# Patient Record
Sex: FEMALE | Race: ASIAN | NOT HISPANIC OR LATINO | URBAN - METROPOLITAN AREA
[De-identification: names, ages, dates, MRNs, and addresses within clinical notes are randomized per-mention and may not be internally consistent; named-entity substitution may affect disease eponyms.]

---

## 2023-06-20 ENCOUNTER — LAB (OUTPATIENT)
Dept: LAB | Facility: LAB | Age: 28
End: 2023-06-20
Payer: COMMERCIAL

## 2023-06-20 LAB — TOBACCO SCREEN, URINE: NEGATIVE

## 2023-10-14 PROBLEM — N89.8 VAGINAL DISCHARGE: Status: ACTIVE | Noted: 2023-10-14

## 2023-10-14 RX ORDER — TRIAMCINOLONE ACETONIDE 1 MG/G
1 CREAM TOPICAL 2 TIMES DAILY
COMMUNITY
Start: 2021-09-27

## 2023-10-14 RX ORDER — AZELAIC ACID 0.15 G/G
1 GEL TOPICAL DAILY
COMMUNITY
Start: 2021-11-15 | End: 2024-02-10 | Stop reason: ALTCHOICE

## 2023-10-17 ENCOUNTER — OFFICE VISIT (OUTPATIENT)
Dept: OBSTETRICS AND GYNECOLOGY | Facility: CLINIC | Age: 28
End: 2023-10-17
Payer: COMMERCIAL

## 2023-10-17 VITALS
BODY MASS INDEX: 21.44 KG/M2 | WEIGHT: 121 LBS | DIASTOLIC BLOOD PRESSURE: 72 MMHG | HEIGHT: 63 IN | SYSTOLIC BLOOD PRESSURE: 117 MMHG

## 2023-10-17 DIAGNOSIS — Z01.419 WELL WOMAN EXAM WITH ROUTINE GYNECOLOGICAL EXAM: Primary | ICD-10-CM

## 2023-10-17 DIAGNOSIS — L70.8 OTHER ACNE: ICD-10-CM

## 2023-10-17 PROCEDURE — 99395 PREV VISIT EST AGE 18-39: CPT | Performed by: OBSTETRICS & GYNECOLOGY

## 2023-10-17 PROCEDURE — 1036F TOBACCO NON-USER: CPT | Performed by: OBSTETRICS & GYNECOLOGY

## 2023-10-17 PROCEDURE — 88175 CYTOPATH C/V AUTO FLUID REDO: CPT

## 2023-10-17 NOTE — PROGRESS NOTES
"Theresa Kiran is a 28 y.o. female who is here for a routine exam.     Periods are  every other month , lasting 2-10 days.       Sexually active: Condoms    Regular self breast exam: yes    Menstrual History:  OB History          0    Para   0    Term   0       0    AB   0    Living   0         SAB   0    IAB   0    Ectopic   0    Multiple   0    Live Births   0                Patient's last menstrual period was 10/04/2023.         Review of Systems  All Normal Review of Systems  Constitutional: no fever, no chills, no recent weight gain, no recent weight loss and no fatigue.    Gastrointestinal: no abdominal pain, no constipation, no nausea, no diarrhea and no vomiting.    Genitourinary: no dysuria, no urinary incontinence, no vaginal dryness, no vaginal itching, no dyspareunia, no pelvic pain, no dysmenorrhea, no sexual problems, no change in urinary frequency, no vaginal discharge, no unexplained vaginal bleeding and no lesion/sore.    Breasts: No masses.  No nipple discharge.  No redness    Objective   /72   Ht 1.6 m (5' 3\")   Wt 54.9 kg (121 lb)   LMP 10/04/2023   BMI 21.43 kg/m²     OBGyn Exam   Physical Exam  Constitutional: Alert and in no acute distress. Well developed, well nourished.   Head and Face: Head and face: Normal.    Eyes: Normal external exam - nonicteric sclera, extraocular movements intact (EOMI) and no ptosis.   Ears, Nose, Mouth, and Throat: External inspection of ears and nose: Normal.    Neck: No neck asymmetry. Supple. Thyroid not enlarged and there were no palpable thyroid nodules.   Chest: Breasts: Normal appearance, no nipple discharge and no skin changes. Palpation of breasts and axillae: No palpable mass and no axillary lymphadenopathy.   Abdomen: Soft nontender; no abdominal mass palpated. No organomegaly. No hernias.     Genitourinary:   External genitalia: Normal. No inguinal lymphadenopathy. Bartholin's Urethral and Skenes Glands: Normal. Urethra: Normal.  "   Bladder: Normal on palpation.   Vagina: Normal. No discharge  Cervix: Normal.    Uterus: Normal.    Right Adnexa/parametria: Normal.  Left Adnexa/parametria: Normal.    Inspection of Perianal Area: Normal.     Musculoskeletal: No joint swelling seen, normal movements of all extremities.   Skin: Normal skin color and pigmentation, normal skin turgor, and no rash.   Neurologic: Non-focal. Grossly intact.   Psychiatric: Alert and oriented x 3. Affect normal to patient baseline. Mood: Appropriate.      Assessment/Plan   1) Annual exam:  Normal exam  Pap with reflex HPV testing completed  You requested a dermatology referral and I have placed that    Thank you again for your visit to our office today  Please follow-up as needed or in 1 year with your annual exam

## 2023-10-30 LAB
CYTOLOGY CMNT CVX/VAG CYTO-IMP: NORMAL
LAB AP HPV GENOTYPE QUESTION: YES
LAB AP HPV HR: NORMAL
LABORATORY COMMENT REPORT: NORMAL
LMP START DATE: NORMAL
PATH REPORT.TOTAL CANCER: NORMAL

## 2024-02-10 ENCOUNTER — OFFICE VISIT (OUTPATIENT)
Dept: DERMATOLOGY | Facility: CLINIC | Age: 29
End: 2024-02-10
Payer: COMMERCIAL

## 2024-02-10 DIAGNOSIS — D22.9 NEVUS: Primary | ICD-10-CM

## 2024-02-10 DIAGNOSIS — L70.0 ACNE VULGARIS: ICD-10-CM

## 2024-02-10 PROCEDURE — 1036F TOBACCO NON-USER: CPT | Performed by: NURSE PRACTITIONER

## 2024-02-10 PROCEDURE — 99203 OFFICE O/P NEW LOW 30 MIN: CPT | Performed by: NURSE PRACTITIONER

## 2024-02-10 RX ORDER — TRETINOIN 1 MG/G
CREAM TOPICAL
Qty: 135 G | Refills: 3 | Status: SHIPPED | OUTPATIENT
Start: 2024-02-10 | End: 2024-02-12

## 2024-02-10 RX ORDER — AZELAIC ACID 0.15 G/G
GEL TOPICAL
Qty: 100 G | Refills: 3 | Status: SHIPPED | OUTPATIENT
Start: 2024-02-10

## 2024-02-10 ASSESSMENT — DERMATOLOGY QUALITY OF LIFE (QOL) ASSESSMENT
RATE HOW BOTHERED YOU ARE BY SYMPTOMS OF YOUR SKIN PROBLEM (EG, ITCHING, STINGING BURNING, HURTING OR SKIN IRRITATION): 1
RATE HOW EMOTIONALLY BOTHERED YOU ARE BY YOUR SKIN PROBLEM (FOR EXAMPLE, WORRY, EMBARRASSMENT, FRUSTRATION): 1
RATE HOW BOTHERED YOU ARE BY EFFECTS OF YOUR SKIN PROBLEMS ON YOUR ACTIVITIES (EG, GOING OUT, ACCOMPLISHING WHAT YOU WANT, WORK ACTIVITIES OR YOUR RELATIONSHIPS WITH OTHERS): 1
RATE HOW BOTHERED YOU ARE BY SYMPTOMS OF YOUR SKIN PROBLEM (EG, ITCHING, STINGING BURNING, HURTING OR SKIN IRRITATION): 1
RATE HOW BOTHERED YOU ARE BY EFFECTS OF YOUR SKIN PROBLEMS ON YOUR ACTIVITIES (EG, GOING OUT, ACCOMPLISHING WHAT YOU WANT, WORK ACTIVITIES OR YOUR RELATIONSHIPS WITH OTHERS): 1
RATE HOW EMOTIONALLY BOTHERED YOU ARE BY YOUR SKIN PROBLEM (FOR EXAMPLE, WORRY, EMBARRASSMENT, FRUSTRATION): 1

## 2024-02-10 ASSESSMENT — PATIENT GLOBAL ASSESSMENT (PGA): WHAT IS THE PGA: PATIENT GLOBAL ASSESSMENT:  3 - MODERATE

## 2024-02-10 NOTE — PROGRESS NOTES
Subjective     Theresa Kiran is a 28 y.o. female who presents for the following: Suspicious Skin Lesion (Lesion to plantar foot (right). Onset 1 month ago. Denies pain, itching or bleeding. No further complaints.).     Review of Systems:  No other skin or systemic complaints other than what is documented elsewhere in the note.    The following portions of the chart were reviewed this encounter and updated as appropriate:         Skin Cancer History  No skin cancer on file.      Specialty Problems    None       Objective   Well appearing patient in no apparent distress; mood and affect are within normal limits.    A full examination was performed including scalp, head, eyes, ears, nose, lips, neck, chest, axillae, abdomen, back, buttocks, bilateral upper extremities, bilateral lower extremities, hands, feet, fingers, toes, fingernails, and toenails. All findings within normal limits unless otherwise noted below.    Assessment/Plan   1. Nevus  Right Middle Plantar Surface  3mm brown macule with no atypia on dermoscopy          - Reassured, the lesion is benign  - Please call the office if the lesion changes or becomes symptomatic    2. Acne vulgaris  Head - Anterior (Face)  Scattered open comedones    -Discussed the nature of the condition  -Discussed treatment options  -Recommend to begin topical retinoids; if just starting therapy with retinoid, start application of a very thin layer three nights per week as tolerated. If extreme redness or dryness occurs, hold medication until resolved and then restart at a greater interval. May apply moisturizer after application of retinoid. Advance toward nightly use as tolerated  -Recommend: Continue tretinoin 0.1% and azelaic acid, use as directed.

## 2024-02-12 DIAGNOSIS — L70.0 ACNE VULGARIS: Primary | ICD-10-CM

## 2024-02-12 RX ORDER — TRETINOIN 0.1 MG/G
GEL TOPICAL
Qty: 135 G | Refills: 3 | Status: SHIPPED | OUTPATIENT
Start: 2024-02-12

## 2024-02-12 RX ORDER — TRETINOIN 0.1 MG/G
GEL TOPICAL NIGHTLY
Qty: 135 G | Refills: 3 | Status: SHIPPED | OUTPATIENT
Start: 2024-02-12 | End: 2025-02-11

## 2024-02-16 ENCOUNTER — APPOINTMENT (OUTPATIENT)
Dept: DERMATOLOGY | Facility: CLINIC | Age: 29
End: 2024-02-16
Payer: COMMERCIAL

## 2024-05-01 ENCOUNTER — APPOINTMENT (OUTPATIENT)
Dept: DERMATOLOGY | Facility: CLINIC | Age: 29
End: 2024-05-01
Payer: COMMERCIAL

## 2024-07-26 ENCOUNTER — OFFICE VISIT (OUTPATIENT)
Dept: DERMATOLOGY | Facility: CLINIC | Age: 29
End: 2024-07-26
Payer: COMMERCIAL

## 2024-07-26 DIAGNOSIS — D48.5 NEOPLASM OF UNCERTAIN BEHAVIOR OF SKIN: Primary | ICD-10-CM

## 2024-07-26 DIAGNOSIS — D22.9 MELANOCYTIC NEVUS, UNSPECIFIED LOCATION: ICD-10-CM

## 2024-07-26 PROCEDURE — 11104 PUNCH BX SKIN SINGLE LESION: CPT | Performed by: STUDENT IN AN ORGANIZED HEALTH CARE EDUCATION/TRAINING PROGRAM

## 2024-07-26 PROCEDURE — 99213 OFFICE O/P EST LOW 20 MIN: CPT | Performed by: STUDENT IN AN ORGANIZED HEALTH CARE EDUCATION/TRAINING PROGRAM

## 2024-07-26 NOTE — PROGRESS NOTES
Subjective     Theresa Kiran is a 29 y.o. female who presents for the following: Suspicious Skin Lesion (LV 2/10/24 Dr. Mayne. Lesion Right Plantar Foot which has increased in size since last visit. Patient is also concerned with lesion under left ear. No history of skin cancer.).     Review of Systems:  No other skin or systemic complaints other than what is documented elsewhere in the note.    The following portions of the chart were reviewed this encounter and updated as appropriate:          Skin Cancer History  No skin cancer on file.      Specialty Problems    None       Objective   Well appearing patient in no apparent distress; mood and affect are within normal limits.    A focused skin examination was performed. All findings within normal limits unless otherwise noted below.    Assessment/Plan   1. Neoplasm of uncertain behavior of skin  Right Middle Plantar Surface  4 mm brown macule    Lesion biopsy  Type of biopsy: punch    Informed consent: discussed and consent obtained    Timeout: patient name, date of birth, surgical site, and procedure verified    Procedure prep:  Patient was prepped and draped  Anesthesia: the lesion was anesthetized in a standard fashion    Anesthetic:  1% lidocaine w/ epinephrine 1-100,000 local infiltration  Punch size:  4 mm  Hemostasis achieved with: Gelfoam    Outcome: patient tolerated procedure well    Post-procedure details: sterile dressing applied and wound care instructions given    Dressing type: bandage and petrolatum      Staff Communication: Dermatology Local Anesthesia: 1 % Lidocaine / Epinephrine - Amount: 3 ml    Specimen 1 - Dermatopathology- DERM LAB  Differential Diagnosis: growing acral nevus, r/o atypia  Check Margins Yes/No?:    Comments:    Dermpath Lab: Routine Histopathology (formalin-fixed tissue)        2. Melanocytic nevus, unspecified location  Benign mole behind left ear  Counseled on benign nature  Follow up if changing or symptomatic

## 2024-08-12 LAB
LAB AP ASR DISCLAIMER: NORMAL
LABORATORY COMMENT REPORT: NORMAL
PATH REPORT.FINAL DX SPEC: NORMAL
PATH REPORT.GROSS SPEC: NORMAL
PATH REPORT.MICROSCOPIC SPEC OTHER STN: NORMAL
PATH REPORT.RELEVANT HX SPEC: NORMAL
PATH REPORT.TOTAL CANCER: NORMAL

## 2024-08-13 DIAGNOSIS — L81.8 ATYPICAL MELANOCYTIC HYPERPLASIA: Primary | ICD-10-CM

## 2024-08-13 NOTE — RESULT ENCOUNTER NOTE
Dr. Gusman informed patient of Right Middle Plantar Surface Punch Biopsy Results: Atypical Melanocytic Hyperplasia. Excision referral was placed on 8/13/24.

## 2024-08-18 DIAGNOSIS — C43.9 MELANOMA OF SKIN (MULTI): ICD-10-CM

## 2024-08-18 NOTE — TUMOR BOARD NOTE
General Patient Information  Name:  Theresa Kiran  Evaluation #:  1  Conference Date:  8/19/2024  YOB: 1995  MRN:  [id number]  Program Physician(s):  Lanre Granda  Referring Physician(s):  Samir Paris      Summary   Stage:      Assessment:  Atypical melanocytic hyperplasia of the right middle plantar surface with concern for a melanoma in situ.    Recommendation:  WLE with 5 mm margins.    Review Multidisciplinary Cutaneous Oncology Conference recommendation with patient.  Continue routine follow up and total body skin exams with Shala Gusman.    Follow Up:  Samir Paris.      History and Physical Exam  Dermatologic History:   29 y.o. female with a biopsy of the right middle plantar surgace on 7/26/2024 showing an atypical melanocytic hyperplasia with concern for a melanoma in situ.    She is scheduled for a WLE with Dr. Centeno on 8/28/2024.    Past Medical History:    Past Medical History:   Diagnosis Date    Other conditions influencing health status     Nulliparity           Pathology  Dermatopathology- DERM LAB: [id number]  Order: [id number]   Collected 7/26/2024 13:07       Status: Final result       Visible to patient: Yes (seen)       Dx: Neoplasm of uncertain behavior of skin    2 Result Notes      Component    FINAL DIAGNOSIS   SKIN, RIGHT MIDDLE PLANTAR SURFACE, PUNCH BIOPSY:  ATYPICAL MELANOCYTIC HYPERPLASIA, PRESENT ON THE PERIPHERAL MARGIN, SEE NOTE.     Note: Microscopic examination reveals a specimen that extends into the subcutaneous fat. There is a proliferation of nested and single melanocytes along the dermal-epidermal junction with significant pagetoid extension. These melanocytes have mildly enlarged nuclei with mild to moderate cytoplasm. Approximately seventy percent do not stain with antibodies against p16. Approximately thirty percent of the melanocytes stain with antibodies against PRAME. All control slides stain appropriately.     This  specimen has significant pagetoid extension, greater than that typically expected for an acral nevus. With the unusual immunohistochemical staining early melanoma in situ cannot be excluded and a complete excision with 5 mm margins is recommended.     ** Electronically signed out by Nevin Snow MD **

## 2024-08-19 ENCOUNTER — TUMOR BOARD CONFERENCE (OUTPATIENT)
Dept: HEMATOLOGY/ONCOLOGY | Facility: HOSPITAL | Age: 29
End: 2024-08-19
Payer: COMMERCIAL

## 2024-08-28 ENCOUNTER — APPOINTMENT (OUTPATIENT)
Dept: DERMATOLOGY | Facility: CLINIC | Age: 29
End: 2024-08-28
Payer: COMMERCIAL

## 2024-08-28 DIAGNOSIS — D48.5 NEOPLASM OF UNCERTAIN BEHAVIOR OF SKIN: ICD-10-CM

## 2024-08-28 PROCEDURE — 11422 EXC H-F-NK-SP B9+MARG 1.1-2: CPT | Performed by: DERMATOLOGY

## 2024-08-28 RX ORDER — MUPIROCIN 20 MG/G
OINTMENT TOPICAL DAILY
Qty: 22 G | Refills: 0 | Status: SHIPPED | OUTPATIENT
Start: 2024-08-28 | End: 2024-09-11

## 2024-08-28 RX ORDER — CHLORHEXIDINE GLUCONATE 40 MG/ML
SOLUTION TOPICAL DAILY PRN
Qty: 118 ML | Refills: 0 | Status: SHIPPED | OUTPATIENT
Start: 2024-08-28 | End: 2024-09-11

## 2024-08-28 NOTE — PROGRESS NOTES
Excision Operative Note    Date of Surgery:  8/28/2024  Surgeon:  Samir Centeno MD PhD  Office Location: [address] [address]  Dept: [phone]  Dept Fax: [phone]  Referring Provider: Shala Gusman MD  [address] [place] [address]  [address], [address] [place] [address]    Subjective   Theresa Kiran is a 29 y.o. female who presents for the following: Excision (MIS right middle plantar surface) for melanoma.    According to the patient, the lesion has been present for approximately 6 months at the time of diagnosis.  The lesion is growing.  The lesion has not been treated previously.    The patient does not have a pacemaker / defibrillator.  The patient does not have a heart valve / joint replacement.    The patient is not on blood thinners.   The patient does not have a history of hepatitis B or C.  The patient does not have a history of HIV.  The patient does not have a history of immunosuppression (e.g. organ transplantation, malignancy, medications)        The following portions of the chart were reviewed this encounter and updated as appropriate:         Assessment/Plan   Pre-procedure:   Obtained informed consent: written from patient  The surgical site was identified and confirmed with the patient.     Intra-operative:   Audible time out called at : 2:05 PM 08/28/24  by: Pedro Bello MA   Verified patient name, birthdate, site, specimen bottle label & requisition.    The planned procedure(s) was again reviewed with the patient. The risks of bleeding, infection, nerve damage and scarring were reviewed. The patient identity, surgical site, and planned procedure(s) were verified.     Biopsy Accession Number: [id number]  Neoplasm of uncertain behavior of skin  Right Middle Plantar Surface    Skin excision    Lesion length (cm):  0.2  Lesion width (cm):  0.2  Margin per side (cm):  0.5  Total excision diameter (cm):  1.2  Informed consent: discussed and  consent obtained    Timeout: patient name, date of birth, surgical site, and procedure verified    Procedure prep:  Patient prepped in sterile fashion  Anesthesia: the lesion was anesthetized in a standard fashion    Anesthetic:  1% lidocaine w/ epinephrine 1-100,000 local infiltration  Instrument used: #15 blade    Hemostasis achieved with: electrodesiccation    Outcome: patient tolerated procedure well with no complications    Post-procedure details: sterile dressing applied and wound care instructions given    Dressing type: pressure dressing, Gelfoam, petrolatum, Telfa pad and Hypafix    Additional details:  The possible diagnoses were explained. Although the lesion is likely benign, the patient requests removal of the lesion because of the symptoms it is causing. Excision was discussed with the patient. The risks, benefits and potential adverse effects were reviewed. Discussion included but was not limited to the cure rate, relative cost, wound care requirements, activity restrictions, likely scar outcome and time to heal were reviewed. Alternative options including monitoring the lesion were discussed. The patient elected to proceed with excision.     Staff Communication: Dermatology Local Anesthesia: 1 % Lidocaine / Epinephrine - Amount: 6cc    Specimen 1 - Dermatopathology- DERM LAB  Differential Diagnosis: Atypical melanocytic hyperslasia  Check Margins Yes  Comments:  Ref to [id number]  Dermpath Lab: Routine Histopathology (formalin-fixed tissue)    Related Medications  mupirocin (Bactroban) 2 % ointment  Apply topically once daily for 14 days.    chlorhexidine (Hibiclens) 4 % external liquid  Apply topically once daily as needed for wound care for up to 14 days.    Various closure modalities were discussed, and it was decided with the patient that the wound would be allowed to heal by granulation. Additional risks of white scarring and slow healing time explained.    Wound care was discussed, and the  patient was given written post-operative wound care instructions.      The patient will follow up with Samir Centeno MD PhD as needed for any post operative problems or concerns, and will follow up with their primary dermatologist as scheduled.

## 2024-08-30 LAB
LABORATORY COMMENT REPORT: NORMAL
PATH REPORT.FINAL DX SPEC: NORMAL
PATH REPORT.GROSS SPEC: NORMAL
PATH REPORT.RELEVANT HX SPEC: NORMAL
PATH REPORT.TOTAL CANCER: NORMAL

## 2024-09-03 NOTE — RESULT ENCOUNTER NOTE
Patient called. No answer. Voicemail left with call back number and the following results.     Benign. The deep margin is read as involving changes from previous procedure, but the collected specimen was elevated in the fat - well below the depth of the previous shave sampling. There is no need for further sampling of this lesion.

## 2024-10-22 ENCOUNTER — APPOINTMENT (OUTPATIENT)
Dept: OBSTETRICS AND GYNECOLOGY | Facility: CLINIC | Age: 29
End: 2024-10-22
Payer: COMMERCIAL

## 2024-10-22 VITALS
BODY MASS INDEX: 21.79 KG/M2 | WEIGHT: 123 LBS | SYSTOLIC BLOOD PRESSURE: 104 MMHG | DIASTOLIC BLOOD PRESSURE: 60 MMHG | HEIGHT: 63 IN

## 2024-10-22 DIAGNOSIS — Z01.419 WELL WOMAN EXAM WITH ROUTINE GYNECOLOGICAL EXAM: Primary | ICD-10-CM

## 2024-10-22 DIAGNOSIS — N92.6 IRREGULAR MENSTRUAL BLEEDING: ICD-10-CM

## 2024-10-22 PROCEDURE — 1036F TOBACCO NON-USER: CPT | Performed by: OBSTETRICS & GYNECOLOGY

## 2024-10-22 PROCEDURE — 99395 PREV VISIT EST AGE 18-39: CPT | Performed by: OBSTETRICS & GYNECOLOGY

## 2024-10-22 PROCEDURE — 3008F BODY MASS INDEX DOCD: CPT | Performed by: OBSTETRICS & GYNECOLOGY

## 2024-10-22 RX ORDER — MEDROXYPROGESTERONE ACETATE 10 MG/1
TABLET ORAL
Qty: 10 TABLET | Refills: 0 | Status: SHIPPED | OUTPATIENT
Start: 2024-10-22

## 2024-10-22 NOTE — PROGRESS NOTES
"Theresa Kiran is a 29 y.o. female who is here for a annual exam.     Periods are irregular, has not had a cycle in 3 months.       Sexually active: Condoms  Active no concerns    Regular self breast exam: yes  no concerns on her exams    Menstrual History:  OB History          0    Para   0    Term   0       0    AB   0    Living   0         SAB   0    IAB   0    Ectopic   0    Multiple   0    Live Births   0                Patient's last menstrual period was 2024.         Review of Systems  All Normal Review of Systems  Constitutional: no fever, no chills, no recent weight gain, no recent weight loss and no fatigue.    Gastrointestinal: no abdominal pain, no constipation, no nausea, no diarrhea and no vomiting.    Genitourinary: no dysuria, no urinary incontinence, no vaginal dryness, no vaginal itching, no dyspareunia, no pelvic pain, no dysmenorrhea, no sexual problems, no change in urinary frequency, no vaginal discharge, no unexplained vaginal bleeding and no lesion/sore.    Breasts: No masses.  No nipple discharge.  No redness    Objective   /60   Ht 1.6 m (5' 3\")   Wt 55.8 kg (123 lb)   LMP 2024   BMI 21.79 kg/m²     OBGyn Exam   Physical Exam  Constitutional: Alert and in no acute distress. Well developed, well nourished.   Head and Face: Head and face: Normal.    Eyes: Normal external exam - nonicteric sclera, extraocular movements intact (EOMI) and no ptosis.   Ears, Nose, Mouth, and Throat: External inspection of ears and nose: Normal.    Neck: No neck asymmetry. Supple. Thyroid not enlarged and there were no palpable thyroid nodules.   Chest: Breasts: Normal appearance, no nipple discharge and no skin changes. Palpation of breasts and axillae: No palpable mass and no axillary lymphadenopathy.   Abdomen: Soft nontender; no abdominal mass palpated. No organomegaly. No hernias.     Genitourinary:   External genitalia: Normal. No inguinal lymphadenopathy. Bartholin's Urethral " and Skenes Glands: Normal. Urethra: Normal.    Bladder: Normal on palpation.   Vagina: Normal. No discharge  Cervix: Normal.    Uterus: Normal.    Right Adnexa/parametria: Normal.  Left Adnexa/parametria: Normal.    Inspection of Perianal Area: Normal.     Musculoskeletal: No joint swelling seen, normal movements of all extremities.   Skin: Normal skin color and pigmentation, normal skin turgor, and no rash.   Neurologic: Non-focal. Grossly intact.   Psychiatric: Alert and oriented x 3. Affect normal to patient baseline. Mood: Appropriate.      Assessment/Plan   1) Annual exam:  Pap with reflex HPV testing completed    2) Irregular menstruation  Has checked pregnancy test that are negative  Has been under a lot of stress with her work.  History of skipping cycles in the past.  Will give Provera to patient.  Will check a pregnancy test and if negative will take for 10 days.  After 10 nights she should have a cycle.  Will keep me posted on [employer]  Thank you again for your visit to our office today  Please follow-up as needed or in 1 year with your annual exam

## 2024-10-29 ENCOUNTER — APPOINTMENT (OUTPATIENT)
Dept: OTOLARYNGOLOGY | Facility: HOSPITAL | Age: 29
End: 2024-10-29
Payer: COMMERCIAL

## 2024-10-31 LAB
CYTOLOGY CMNT CVX/VAG CYTO-IMP: NORMAL
LAB AP HPV GENOTYPE QUESTION: YES
LAB AP HPV HR: NORMAL
LABORATORY COMMENT REPORT: NORMAL
PATH REPORT.TOTAL CANCER: NORMAL

## 2024-12-03 ENCOUNTER — APPOINTMENT (OUTPATIENT)
Dept: OTOLARYNGOLOGY | Facility: HOSPITAL | Age: 29
End: 2024-12-03
Payer: COMMERCIAL

## 2024-12-30 ENCOUNTER — OFFICE VISIT (OUTPATIENT)
Dept: URGENT CARE | Age: 29
End: 2024-12-30
Payer: COMMERCIAL

## 2024-12-30 VITALS
SYSTOLIC BLOOD PRESSURE: 121 MMHG | HEIGHT: 63 IN | DIASTOLIC BLOOD PRESSURE: 83 MMHG | WEIGHT: 120 LBS | HEART RATE: 108 BPM | BODY MASS INDEX: 21.26 KG/M2 | TEMPERATURE: 101.1 F | OXYGEN SATURATION: 96 %

## 2024-12-30 DIAGNOSIS — J01.10 ACUTE FRONTAL SINUSITIS, RECURRENCE NOT SPECIFIED: ICD-10-CM

## 2024-12-30 DIAGNOSIS — R50.9 FEVER, UNSPECIFIED FEVER CAUSE: ICD-10-CM

## 2024-12-30 DIAGNOSIS — H66.002 ACUTE SUPPURATIVE OTITIS MEDIA OF LEFT EAR WITHOUT SPONTANEOUS RUPTURE OF TYMPANIC MEMBRANE, RECURRENCE NOT SPECIFIED: Primary | ICD-10-CM

## 2024-12-30 DIAGNOSIS — R06.09 DYSPNEA ON EXERTION: ICD-10-CM

## 2024-12-30 LAB
POC RAPID INFLUENZA A: NEGATIVE
POC RAPID INFLUENZA B: NEGATIVE

## 2024-12-30 PROCEDURE — 99070 SPECIAL SUPPLIES PHYS/QHP: CPT | Performed by: NURSE PRACTITIONER

## 2024-12-30 PROCEDURE — 3008F BODY MASS INDEX DOCD: CPT | Performed by: NURSE PRACTITIONER

## 2024-12-30 PROCEDURE — 87804 INFLUENZA ASSAY W/OPTIC: CPT | Performed by: NURSE PRACTITIONER

## 2024-12-30 PROCEDURE — 99203 OFFICE O/P NEW LOW 30 MIN: CPT | Performed by: NURSE PRACTITIONER

## 2024-12-30 PROCEDURE — 1036F TOBACCO NON-USER: CPT | Performed by: NURSE PRACTITIONER

## 2024-12-30 RX ORDER — ALBUTEROL SULFATE 90 UG/1
2 INHALANT RESPIRATORY (INHALATION) EVERY 6 HOURS PRN
Qty: 18 G | Refills: 0 | Status: SHIPPED | OUTPATIENT
Start: 2024-12-30 | End: 2025-12-30

## 2024-12-30 RX ORDER — BENZONATATE 200 MG/1
200 CAPSULE ORAL 3 TIMES DAILY PRN
Qty: 30 CAPSULE | Refills: 0 | Status: SHIPPED | OUTPATIENT
Start: 2024-12-30 | End: 2025-01-06

## 2024-12-30 RX ORDER — AMOXICILLIN AND CLAVULANATE POTASSIUM 875; 125 MG/1; MG/1
875 TABLET, FILM COATED ORAL 2 TIMES DAILY
Qty: 20 TABLET | Refills: 0 | Status: SHIPPED | OUTPATIENT
Start: 2024-12-30

## 2024-12-30 ASSESSMENT — ENCOUNTER SYMPTOMS
FATIGUE: 1
RHINORRHEA: 1
SINUS PAIN: 1
FEVER: 1
COUGH: 1

## 2024-12-30 NOTE — PROGRESS NOTES
Subjective   Patient ID: Theresa Kiran is a 29 y.o. female. They present today with a chief complaint of No chief complaint on file..    History of Present Illness  HPI    Past Medical History  Allergies as of 12/30/2024    (No Known Allergies)       (Not in a hospital admission)       Past Medical History:   Diagnosis Date    Other conditions influencing health status     Nulliparity       Past Surgical History:   Procedure Laterality Date    OTHER SURGICAL HISTORY  11/10/2022    Arm surgery        reports that she has never smoked. She has never used smokeless tobacco. She reports current alcohol use of about 1.0 standard drink of alcohol per week. She reports that she does not use drugs.    Review of Systems  Review of Systems   Constitutional:  Positive for fatigue and fever.   HENT:  Positive for congestion, postnasal drip, rhinorrhea and sinus pain.    Respiratory:  Positive for cough.    All other systems reviewed and are negative.                                 Objective    There were no vitals filed for this visit.  No LMP recorded.    Physical Exam  Vitals and nursing note reviewed.   Constitutional:       Appearance: Normal appearance.   HENT:      Head: Normocephalic.      Right Ear: Tympanic membrane normal.      Left Ear: Tympanic membrane is erythematous and bulging.      Ears:      Comments: Right TM cloudy and left TM erythematous/bulging/opacification     Nose: Congestion and rhinorrhea present. Rhinorrhea is purulent.      Right Turbinates: Enlarged.      Left Turbinates: Enlarged.      Right Sinus: Frontal sinus tenderness present.      Left Sinus: Frontal sinus tenderness present.      Mouth/Throat:      Mouth: Mucous membranes are dry.      Pharynx: Oropharynx is clear. Postnasal drip present.   Eyes:      Extraocular Movements: Extraocular movements intact.      Pupils: Pupils are equal, round, and reactive to light.   Cardiovascular:      Rate and Rhythm: Normal rate and regular rhythm.       Pulses: Normal pulses.      Heart sounds: Normal heart sounds.   Pulmonary:      Effort: Pulmonary effort is normal.      Breath sounds: Normal breath sounds.   Musculoskeletal:         General: Normal range of motion.      Cervical back: Normal range of motion and neck supple.   Lymphadenopathy:      Cervical: Cervical adenopathy present.   Skin:     General: Skin is warm and dry.   Neurological:      General: No focal deficit present.      Mental Status: She is alert and oriented to person, place, and time.   Psychiatric:         Mood and Affect: Mood normal.         Behavior: Behavior normal.         Procedures    Point of Care Test & Imaging Results from this visit  No results found for this visit on 12/30/24.   No results found.    Diagnostic study results (if any) were reviewed by [facility].    Assessment/Plan   Allergies, medications, history, and pertinent labs/EKGs/Imaging reviewed by IDA Silverman.     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 Cough is throughout the day and severity of day or night does not matter.  Pt took x 2 covid tests, neg  Rapid flu- NEG    Pt managing secretions, airway intact. There or no signs of PTA/TA, trismus, retropharyngeal abscess or epiglotitis. No signs of osteomyelitis, orbital cellulitis or other complications of sinusitis at this point in time. CXR deferred at this time there is no signs of respiratory distress,  pt rather be treated first for left AOM with augmentin and agreed to chest xray if s/s persist after 48 hours with treatment. encouraged probiotics for gut health. SpO2 >94% on RA. There is no reported CP, BARRIOS, pleuritic pain, fever. Clinical suspicions of pneumonia at this time are low, high probability of acute frontal sinusitis with left AOM. Pt is ambulating without difficulty showing no signs of hypoxia. Given the pt underlying  risk factors, and exam will err on the side of caution and cover for underlying ARBS/CAP organisms . Pt well-hydrated, nontoxic and there no signs of clinical deterioration. Patient well hydrated, neurovascularly intact.     Follow up Care: Pt instructed to follow-up with PCP or other appropriate clinician within 24 to 48 hours. Report to ED if there is any development in worsening pain, difficulty swallowing, change in phonation, fever, chills, neck pain, photophobia, headache, neck stiffness, chest pain, abdominal pain, vomiting, syncope, hemoptysis, leg swelling SOB, fever, facial swelling, eye pain, periorbital swelling/erythema, or any new signs or sx.     The patient and/or family was educated regarding diagnosis, supportive care, OTC and Rx medications. The patient and/or family was given the opportunity to ask questions prior to discharge. They verbalized understanding of my discussion of the plans for treatment, expected course, indications to return to UC or seek further evaluation in ED, and the need for timely follow up as directed.         Orders and Diagnoses  Diagnoses and all orders for this visit:  Acute suppurative otitis media of left ear without spontaneous rupture of tympanic membrane, recurrence not specified  -     amoxicillin-pot clavulanate (Augmentin) 875-125 mg tablet; Take 1 tablet (875 mg) by mouth 2 times a day.  -     benzonatate (Tessalon) 200 mg capsule; Take 1 capsule (200 mg) by mouth 3 times a day as needed for cough for up to 7 days. Do not crush or chew.  Fever, unspecified fever cause  -     POCT Influenza A/B manually resulted      Medical Admin Record      Patient disposition: Home    Electronically signed by [facility]ETAN[facility]  6:15 PM

## 2024-12-30 NOTE — LETTER
December 30, 2024     Patient: Theresa Kiran   YOB: 1995   Date of Visit: 12/30/2024       To Whom It May Concern:    Theresa Kiran was seen in my clinic on 12/30/2024 at 5:30 pm. Please excuse Theresa for her absence from work on this day until 1/2/2025, thank you!    If you have any questions or concerns, please don't hesitate to call.         Sincerely,         [facility]        CC: No Recipients

## 2025-01-13 ENCOUNTER — TUMOR BOARD CONFERENCE (OUTPATIENT)
Dept: HEMATOLOGY/ONCOLOGY | Facility: HOSPITAL | Age: 30
End: 2025-01-13
Payer: COMMERCIAL

## 2025-01-13 DIAGNOSIS — C43.9 MELANOMA OF SKIN (MULTI): ICD-10-CM

## 2025-01-13 NOTE — TUMOR BOARD NOTE
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 S/p WLE with 5 mm margins. Adequately surgically treated.     Recommendation:  Annual H&P.    Review Multidisciplinary Cutaneous Oncology Conference recommendation with patient.  Continue routine follow up and total body skin exams with Shala Gusman.    Follow Up:  Shala Gusman      History and Physical Exam  Dermatologic History:   29 y.o. female with a biopsy of the right middle plantar surgace on 7/26/2024 showing an atypical melanocytic hyperplasia with concern for a melanoma in situ. She underwent a WLE with 5 mm margins on 8/28/2024 which showed melanocyte hyperplasia, inked margins free in the planes of sections examined and changes consistent with previous procedure present on the deep margin.        Past Medical History:    Past Medical History:   Diagnosis Date    Other conditions influencing health status     Nulliparity           Pathology  Dermatopathology- DERM LAB: [id number]  Order: [id number]   Collected 8/28/2024 14:04       Status: Final result       Visible to patient: Yes (seen)       Dx: Neoplasm of uncertain behavior of skin    2 Result Notes       1 Patient Communication      Component    FINAL DIAGNOSIS   SKIN, RIGHT MIDDLE PLANTAR SURFACE, EXCISION:  MELANOCYTE HYPERPLASIA, INKED MARGINS FREE IN THE PLANES OF SECTIONS EXAMINED AND CHANGES CONSISTENT WITH PREVIOUS PROCEDURE, PRESENT ON THE DEEP MARGIN, SEE NOTE     Note: Microscopic examination reveals a specimen that extends into the subcutaneous fat. An area with horizontally oriented collagen and vertically oriented vessels is present. There is a very mild  increase in single melanocytes with minimally enlarged nuclei along the dermal-epidermal junction.      The melanocyte hyperplasia favors melanocyte hyperplasia from the trauma of a previous procedure rather than residual atypical melanocytic hyperplasia.      ** Electronically signed out by Nevin Snow MD **             Dermatopathology- DERM LAB: [id number]  Order: [id number]   Collected 7/26/2024 13:07       Status: Final result       Visible to patient: Yes (seen)       Dx: Neoplasm of uncertain behavior of skin    2 Result Notes      Component    FINAL DIAGNOSIS   SKIN, RIGHT MIDDLE PLANTAR SURFACE, PUNCH BIOPSY:  ATYPICAL MELANOCYTIC HYPERPLASIA, PRESENT ON THE PERIPHERAL MARGIN, SEE NOTE.     Note: Microscopic examination reveals a specimen that extends into the subcutaneous fat. There is a proliferation of nested and single melanocytes along the dermal-epidermal junction with significant pagetoid extension. These melanocytes have mildly enlarged nuclei with mild to moderate cytoplasm. Approximately seventy percent do not stain with antibodies against p16. Approximately thirty percent of the melanocytes stain with antibodies against PRAME. All control slides stain appropriately.     This specimen has significant pagetoid extension, greater than that typically expected for an acral nevus. With the unusual immunohistochemical staining early melanoma in situ cannot be excluded and a complete excision with 5 mm margins is recommended.     ** Electronically signed out by Nevin Snow MD **

## 2025-02-04 ENCOUNTER — OFFICE VISIT (OUTPATIENT)
Dept: OTOLARYNGOLOGY | Facility: HOSPITAL | Age: 30
End: 2025-02-04
Payer: COMMERCIAL

## 2025-02-04 DIAGNOSIS — K13.79 MUCOCELE OF MOUTH: Primary | ICD-10-CM

## 2025-02-04 PROCEDURE — 1036F TOBACCO NON-USER: CPT | Performed by: STUDENT IN AN ORGANIZED HEALTH CARE EDUCATION/TRAINING PROGRAM

## 2025-02-04 PROCEDURE — 99212 OFFICE O/P EST SF 10 MIN: CPT | Performed by: STUDENT IN AN ORGANIZED HEALTH CARE EDUCATION/TRAINING PROGRAM

## 2025-02-04 PROCEDURE — 99202 OFFICE O/P NEW SF 15 MIN: CPT | Performed by: STUDENT IN AN ORGANIZED HEALTH CARE EDUCATION/TRAINING PROGRAM

## 2025-02-04 ASSESSMENT — PATIENT HEALTH QUESTIONNAIRE - PHQ9
1. LITTLE INTEREST OR PLEASURE IN DOING THINGS: NOT AT ALL
SUM OF ALL RESPONSES TO PHQ9 QUESTIONS 1 & 2: 0
2. FEELING DOWN, DEPRESSED OR HOPELESS: NOT AT ALL

## 2025-02-04 NOTE — PROGRESS NOTES
ENT Outpatient Consultation    Chief Complaint: mucocele    History Of Present Illness  Theresa Kiran is a 29 y.o. female presents for recurring bilateral mucoceles.   Reports them on the retromolar trigone area. Has been going on for years.   Sometimes they spontaneously rupture, other times have been compressed to relieve pain/swelling   Currently does not have any symptoms. Most recent was a few weeks ago.     No other painful lesions in the mouth.   ROS otherwise negative  Currently a Rad Onc resident at .          Past Medical History  Past Medical History:   Diagnosis Date    Other conditions influencing health status     Nulliparity         Surgical History  Past Surgical History:   Procedure Laterality Date    OTHER SURGICAL HISTORY  11/10/2022    Arm surgery        Social History  She reports that she has never smoked. She has never been exposed to tobacco smoke. She has never used smokeless tobacco. She reports that she does not currently use alcohol after a past usage of about 1.0 standard drink of alcohol per week. She reports that she does not use drugs.    Family History  Family History   Problem Relation Name Age of Onset    Lung cancer Mother Lung cancer     Cancer Mother Lung cancer         Allergies  Patient has no known allergies.     Physical Exam:  CONSTITUTIONAL:  No acute distress  VOICE:  No hoarseness or other abnormality  RESPIRATION:  Breathing comfortably, no stridor  CV:  No clubbing/cyanosis/edema in hands  EYES:  EOM intact, sclera normal  NEURO:  Alert and oriented times 3, Cranial nerves II-XII grossly intact and symmetric bilaterally  HEAD AND FACE:  Symmetric facial features, no masses or lesion  SALIVARY GLANDS:  Parotid and submandibular glands normal bilaterally  EARS:  Normal external ears, external auditory canals, and TMs to otoscopy, normal hearing to conversational voice.  NOSE:  External nose midline, anterior rhinoscopy is normal with limited visualization to the anterior  aspect of the interior turbinates, no bleeding or drainage, no lesions  ORAL CAVITY/OROPHARYNX/LIPS:  Normal mucous membranes, normal floor of mouth/tongue/OP, no masses or lesions. RMT with pinpoint areas of thickening consisent with prior cyst/mucocele but no current lesion  PHARYNGEAL WALLS:  No masses or lesions  NECK/LYMPH:  No LAD, no thyroid masses, trachea midline  SKIN:  Neck skin is without scar or injury  PSYCH:  Alert and oriented with appropriate mood and affect     Assessment and Plan  29 y.o. female with recurrent oral cavity mucoceles. Currently no lesions. We discussed that if painful could perform I&D the next time that one occurs. I do not have a mouthrinse or medication that would prevent them. We discussed but I do not recommend excision of that area as there are no current lesions and would not ensure that the lesions dont return    Ana eHrnández MD

## 2025-02-19 ENCOUNTER — OFFICE VISIT (OUTPATIENT)
Dept: PRIMARY CARE | Facility: HOSPITAL | Age: 30
End: 2025-02-19
Payer: COMMERCIAL

## 2025-02-19 VITALS
HEIGHT: 62 IN | OXYGEN SATURATION: 98 % | TEMPERATURE: 96.4 F | BODY MASS INDEX: 21.71 KG/M2 | WEIGHT: 118 LBS | HEART RATE: 80 BPM | DIASTOLIC BLOOD PRESSURE: 76 MMHG | SYSTOLIC BLOOD PRESSURE: 111 MMHG

## 2025-02-19 DIAGNOSIS — H60.93 OTITIS EXTERNA OF BOTH EARS, UNSPECIFIED CHRONICITY, UNSPECIFIED TYPE: Primary | ICD-10-CM

## 2025-02-19 PROCEDURE — 3008F BODY MASS INDEX DOCD: CPT

## 2025-02-19 PROCEDURE — 99213 OFFICE O/P EST LOW 20 MIN: CPT | Mod: GC

## 2025-02-19 PROCEDURE — 99203 OFFICE O/P NEW LOW 30 MIN: CPT

## 2025-02-19 PROCEDURE — 1036F TOBACCO NON-USER: CPT

## 2025-02-19 RX ORDER — ACETIC ACID 20.65 MG/ML
5 SOLUTION AURICULAR (OTIC) 2 TIMES DAILY
Qty: 15 ML | Refills: 0 | Status: SHIPPED | OUTPATIENT
Start: 2025-02-19 | End: 2025-02-26

## 2025-02-19 ASSESSMENT — PAIN SCALES - GENERAL: PAINLEVEL_OUTOF10: 0-NO PAIN

## 2025-02-19 ASSESSMENT — ENCOUNTER SYMPTOMS
OCCASIONAL FEELINGS OF UNSTEADINESS: 0
DEPRESSION: 0
LOSS OF SENSATION IN FEET: 0

## 2025-02-19 ASSESSMENT — PATIENT HEALTH QUESTIONNAIRE - PHQ9
SUM OF ALL RESPONSES TO PHQ9 QUESTIONS 1 AND 2: 0
1. LITTLE INTEREST OR PLEASURE IN DOING THINGS: NOT AT ALL
2. FEELING DOWN, DEPRESSED OR HOPELESS: NOT AT ALL

## 2025-02-19 NOTE — PROGRESS NOTES
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    H/o suspicious skin lesion. ? melanoma, removed from bottom of foot 2024  Recent pneumonia  Irregular menses  Family history of lung cancer    She requests Giardisil vaccine series today. She is also markedly better from her pneumonia after taking augmentin and azithromycin. She has no residual SOB but a lingering cough. She also reports bilateral ear pain/itchiness.    Denies tobacco, occasional ETOH, no drug use    Exam:  Thin woman, NAD  /76   Pulse 80   Temp 35.8 °C (96.4 °F) (Temporal)   Ht 1.575 m (5' 2\")   Wt 53.5 kg (118 lb)   SpO2 98%   BMI 21.58 kg/m²   TM with clear bright light reflex bilaterally, canal without redness or scale  Lungs clear BS  CV RR  Abd soft NT    A/P  Agree with topical treatment to otic canals for symptomatic relief. Encouraged to get giardisil at local pharmacy.   RTC in 1 year or sooner if needed.    I saw and evaluated the patient. I personally obtained the key and critical portions of the history and physical exam or was physically present for key and critical portions performed by the resident/fellow. I reviewed the resident/fellow's documentation and discussed the patient with the resident/fellow. I agree with the resident/fellow's medical decision making as documented in the note.    Carlee Martínez MD      "

## 2025-02-19 NOTE — PROGRESS NOTES
Chief complaint: New Patient Visit    HPI:  Theresa Kiran is a 29 y.o. female with pmh of atypical melanocytic hyperplasia of right foot s/p excision presenting to establish care with [facility].    Her main complaint today is bilateral ear itchiness. She recently had pneumonia in December that was treated with Augmentin and Azithromycin. Since then, she has been doing well, just has an occasional cough. Denies dyspnea, chest pain, fever/chills. At the time of the pneumonia, she also had b/l ear pain/itchiness, no drainage. Urgent care told her had an ear infection but she was not prescribed ear drops. Since then she has had ear itchiness. Denies any pain, drainage, or hearing changes.    She was also interested in the Gardasil vaccine and STI testing today. In our clinic, her vaccine would have been very expensive due to her insurance coverage so she will plan to go to a pharmacy for the vaccine. As for the STI testing, she was interested depending on cost and since we were not able to confirm that the testing would be covered by insurance due to the [facility] transition, she decided to forgo testing today. She has an upcoming appt with OB/Gyn and at that point they may be able to better assist her.    No other complaints today.      PMH: atypical melanocytic hyperplasia    PSH: right elbow surgery in childhood    Allergies: NKDA    Fam Hx: mother with NSCLC lung cancer    Soc Hx: no tobacco use, occ alc use, no drug use; rad onc resident        Medications:  Current Outpatient Medications   Medication Instructions    acetic acid (Vosol) 2 % otic solution 5 drops, Each Ear, 2 times daily    albuterol 90 mcg/actuation inhaler 2 puffs, inhalation, Every 6 hours PRN    amoxicillin-pot clavulanate (Augmentin) 875-125 mg tablet 875 mg, oral, 2 times daily    azelaic acid (Finacea) 15 % gel Thin coat to entire face twice daily. Decrease use if too drying.    medroxyPROGESTERone (Provera) 10 mg tablet Take 1 tablet by mouth daily for 10  days after you have checked for negative pregnancy test    tretinoin (Retin-A) 0.01 % gel Apply Pea-size amount to entire face every day at bedtime, decrease frequency if too drying       Allergies:  No Known Allergies    Past medical history:  Past Medical History:   Diagnosis Date    Other conditions influencing health status     Nulliparity       Surgical history:  Past Surgical History:   Procedure Laterality Date    OTHER SURGICAL HISTORY  11/10/2022    Arm surgery       Family history:  Family History   Problem Relation Name Age of Onset    Lung cancer Mother Lung cancer     Cancer Mother Lung cancer        Social history:   reports that she has never smoked. She has never been exposed to tobacco smoke. She has never used smokeless tobacco. She reports that she does not currently use alcohol after a past usage of about 1.0 standard drink of alcohol per week. She reports that she does not use drugs.    Health maintenance:  Health Maintenance   Topic Date Due    Lipid Panel  Never done    HIV Screening  Never done    Derm Melanoma Skin Check  Never done    Hepatitis C Screening  Never done    Influenza Vaccine (1) 09/01/2024    COVID-19 Vaccine (4 - 2024-25 season) 09/01/2024    Yearly Adult Physical  10/23/2025    DTaP/Tdap/Td Vaccines (7 - Td or Tdap) 09/09/2026    Cervical Cancer Screening  10/22/2027    Zoster Vaccines (1 of 2) 06/29/2046    HIB Vaccines  Completed    Hepatitis B Vaccines  Completed    IPV Vaccines  Completed    Hepatitis A Vaccines  Completed    MMR Vaccines  Completed    Varicella Vaccines  Completed    Meningococcal Vaccine  Completed    HPV Vaccines  Completed    Rotavirus Vaccines  Aged Out    Pneumococcal Vaccine: Pediatrics and At-Risk Adult Patients  Aged Out       Sexual History  Currently Sexually Active: Yes  STI Concern/Hx: offer at next visit; declined testing due unsure costs    Review of systems:  Negative except above    Vitals:  Vitals:    02/19/25 1256   BP: 111/76   Pulse:  "80   Temp: 35.8 °C (96.4 °F)   SpO2: 98%       Physical exam:  Physical Exam  Constitutional:       General: She is not in acute distress.     Appearance: Normal appearance.   HENT:      Right Ear: Tympanic membrane, ear canal and external ear normal.      Left Ear: Tympanic membrane, ear canal and external ear normal.   Eyes:      General: No scleral icterus.  Cardiovascular:      Rate and Rhythm: Normal rate and regular rhythm.   Pulmonary:      Effort: No respiratory distress.      Breath sounds: Normal breath sounds. No wheezing or rhonchi.   Abdominal:      General: There is no distension.      Palpations: Abdomen is soft.      Tenderness: There is no abdominal tenderness. There is no guarding.   Musculoskeletal:         General: No swelling or tenderness.   Skin:     Findings: No bruising or rash.   Neurological:      Mental Status: She is alert.      Cranial Nerves: No cranial nerve deficit.      Motor: No weakness.         Labs:  No results found for: \"WBC\", \"HGB\", \"HCT\", \"MCV\", \"PLT\"    No results found for: \"GLUCOSE\", \"CALCIUM\", \"NA\", \"K\", \"CO2\", \"CL\", \"BUN\", \"CREATININE\"    No results found for: \"HGBA1C\"     No results found for: \"CHOL\"  No results found for: \"HDL\"  No results found for: \"LDLCALC\"  No results found for: \"TRIG\"  No components found for: \"CHOLHDL\"    Imaging:  No results found.    Assessment and plan:  Theresa Kiran is a 29 y.o. female with pmh of atypical melanocytic hyperplasia of right foot s/p excision presenting to establish care with [facility]. She had recent PNA that is now resolved but now has b/l ear pruritus. Ears clear on exam. Will prescribe ascetic acid for symptomatic relief. Attempted to prescribe corticosporin but this was not covered by insurance. Last labs drawn in 2021, no indication for repeat lab draws today    #b/l ear pruritus  -prescribing acetic acid otic solution    #atypical melanocytic hyperplasia s/p excision  ::bx 7/26/24: atypical melanocytic hyperplasia  -removed with " "derm 8/2024    #Hx of Irregular Menstruation  -saw OB/Gyn 10/2024 and prescribed Provera to assist with cycles  -next appt 3/17/25      HEALTH MAINTENANCE   Antibody Testing   HIV: offer next visit, deferred today due unclear insurance coverage  Syphilis: \"\"   Hepatitis C: \"\"   Vaccines  Influenza: received in fall   Shingles: n/a >51yo 2 doses 2-6mo apart  Tdap: up to date  Pneumonia: n/a adult <65 w/ risk factors (heart/liver/lung disease; smoking, diabetes) OR >65 unknown vaccine hx PCV 20 -> 1yr -> PPSV 23; PPSV23 -> 1yr -> PCV 20   HPV: interested in receiving today but not covered by insurance at our clinic so will see pharmacy  Cancer screening   Pap Smear: Normal pap 10/24 next due  (21-29 q3 yrs; 30-65 w/HPV q5 yrs if no HPV q3 yrs)   Plan     Follow-up in 1 year.    Patient and plan discussed with attending physician Dr. Martínez.    Deondre Coronel MD,c  "

## 2025-03-17 ENCOUNTER — APPOINTMENT (OUTPATIENT)
Dept: OBSTETRICS AND GYNECOLOGY | Facility: CLINIC | Age: 30
End: 2025-03-17
Payer: COMMERCIAL

## 2025-03-17 ENCOUNTER — OFFICE VISIT (OUTPATIENT)
Dept: DERMATOLOGY | Facility: CLINIC | Age: 30
End: 2025-03-17
Payer: COMMERCIAL

## 2025-03-17 DIAGNOSIS — D22.9 MULTIPLE BENIGN MELANOCYTIC NEVI: ICD-10-CM

## 2025-03-17 DIAGNOSIS — D48.5 NEOPLASM OF UNCERTAIN BEHAVIOR OF SKIN: Primary | ICD-10-CM

## 2025-03-17 DIAGNOSIS — L90.5 SCAR CONDITIONS AND FIBROSIS OF SKIN: ICD-10-CM

## 2025-03-17 PROCEDURE — 11104 PUNCH BX SKIN SINGLE LESION: CPT | Performed by: STUDENT IN AN ORGANIZED HEALTH CARE EDUCATION/TRAINING PROGRAM

## 2025-03-17 PROCEDURE — 11103 TANGNTL BX SKIN EA SEP/ADDL: CPT | Performed by: STUDENT IN AN ORGANIZED HEALTH CARE EDUCATION/TRAINING PROGRAM

## 2025-03-17 PROCEDURE — 99213 OFFICE O/P EST LOW 20 MIN: CPT | Performed by: STUDENT IN AN ORGANIZED HEALTH CARE EDUCATION/TRAINING PROGRAM

## 2025-03-24 LAB
LABORATORY COMMENT REPORT: NORMAL
PATH REPORT.FINAL DX SPEC: NORMAL
PATH REPORT.GROSS SPEC: NORMAL
PATH REPORT.MICROSCOPIC SPEC OTHER STN: NORMAL
PATH REPORT.RELEVANT HX SPEC: NORMAL
PATH REPORT.TOTAL CANCER: NORMAL

## 2025-07-16 ENCOUNTER — APPOINTMENT (OUTPATIENT)
Dept: OPHTHALMOLOGY | Facility: CLINIC | Age: 30
End: 2025-07-16
Payer: COMMERCIAL

## 2025-09-16 ENCOUNTER — APPOINTMENT (OUTPATIENT)
Dept: OPHTHALMOLOGY | Facility: CLINIC | Age: 30
End: 2025-09-16
Payer: COMMERCIAL

## 2025-10-27 ENCOUNTER — APPOINTMENT (OUTPATIENT)
Dept: OBSTETRICS AND GYNECOLOGY | Facility: CLINIC | Age: 30
End: 2025-10-27
Payer: COMMERCIAL